# Patient Record
Sex: FEMALE | Race: WHITE
[De-identification: names, ages, dates, MRNs, and addresses within clinical notes are randomized per-mention and may not be internally consistent; named-entity substitution may affect disease eponyms.]

---
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
## 2021-10-11 ENCOUNTER — HOSPITAL ENCOUNTER (EMERGENCY)
Dept: HOSPITAL 52 - LL.ED | Age: 21
Discharge: HOME | End: 2021-10-11
Payer: COMMERCIAL

## 2021-10-11 DIAGNOSIS — N39.0: Primary | ICD-10-CM

## 2021-10-11 DIAGNOSIS — Z88.0: ICD-10-CM

## 2021-10-11 NOTE — EDM.PDOC
ED HPI GENERAL MEDICAL PROBLEM





- General


Chief Complaint: Genitourinary Problem


Stated Complaint: UTI Symptoms


Time Seen by Provider: 10/11/21 20:35


Source of Information: Reports: Patient


History Limitations: Reports: No Limitations





- History of Present Illness


INITIAL COMMENTS - FREE TEXT/NARRATIVE: 





Pt. presents to ER with complaints of dysuria and frequency for the past day or 

so. She denies any nausea or vomiting. No lightheadedness. Denies any fever or 

chills. 


Pt. has a history of increased frequency of UTIs in the past. The last infection

she was started on macrobid and she states that the symptoms did improve after 

finishing the course of medication.


Pt. denies any fever or chills. Denies any abdomen pain.


Onset: Today


Onset Date: 10/11/21


Quality: Reports: Burning (with urination)





- Related Data


                                    Allergies











Allergy/AdvReac Type Severity Reaction Status Date / Time


 


amoxicillin [From Augmentin] Allergy  Cannot Verified 20 19:30





   Remember  


 


clavulanic acid Allergy  Cannot Verified 20 19:30





[From Augmentin]   Remember  











Home Meds: 


                                    Home Meds





FLUoxetine [PROzac] 10 mg PO DAILY 20 [History]


Pnv No.95/Ferrous Fum/Folic AC [Prenatal Vitamin Tablet] 1 tab PO DAILY 20

[History]


clindamycin HCL [Cleocin HCl] 150 mg PO QID #28 capsule 20 [Rx]











Past Medical History





- Past Health History


Medical/Surgical History: Denies Medical/Surgical History


OB/GYN History: Reports: Pregnancy.  Denies: Spontaneous 


Other OB/GYN History: 8 months gestation with EDC of 2020.


Psychiatric History: Reports: Depression


Dermatologic History: Reports: Other (See Below)


Other Dermatologic History: cat bite to left foot, potential cellulitis 20





- Past Imaging History


Past Imaging History: Reports: Ultrasound (OB ultrasounds including in this fa

cility on 2020.)





Social & Family History





- Caffeine Use


Caffeine Use: Reports: None





- Living Situation & Occupation


Living situation: Reports: with Significant Other (And his parents)


Occupation: Employed ( at local [employer])





ED ROS GENERAL





- Review of Systems


Review Of Systems: See Below


Constitutional: Reports: No Symptoms


HEENT: Reports: No Symptoms


Respiratory: Reports: No Symptoms


Cardiovascular: Reports: No Symptoms


Endocrine: Reports: No Symptoms


GI/Abdominal: Reports: No Symptoms


: Reports: Dysuria


Musculoskeletal: Reports: No Symptoms


Skin: Reports: No Symptoms


Neurological: Reports: No Symptoms


Psychiatric: Reports: No Symptoms


Hematologic/Lymphatic: Reports: No Symptoms


Immunologic: Reports: No Symptoms





ED EXAM, GENERAL





- Physical Exam


Exam: See Below


Exam Limited By: No Limitations


General Appearance: Alert, WD/WN, No Apparent Distress


Neurological: Alert, Oriented, CN II-XII Intact, Normal Cognition, Normal Gait, 

Normal Reflexes, No Motor/Sensory Deficits


Psychiatric: Normal Affect, Normal Mood


Skin Exam: Warm, Dry, Intact, Normal Color, No Rash





Course





- Orders/Labs/Meds


Orders: 





                               Active Orders 24 hr











 Category Date Time Status


 


 CULTURE URINE [RM] Stat Lab  10/11/21 20:33 Received











Labs: 





                                Laboratory Tests











  10/11/21 Range/Units





  20:33 


 


Specimen Type  Urincc  


 


Urine Color  Light yellow  


 


Urine Appearance  Cloudy  


 


Urine pH  7.5  (5.0-9.0)  


 


Ur Specific Gravity  1.020  (1.005-1.030)  


 


Urine Protein  Negative  (NEGATIVE)  mg/dL


 


Urine Glucose (UA)  Negative  (NEGATIVE)  mg/dL


 


Urine Ketones  Negative  (NEGATIVE)  mg/dL


 


Urine Occult Blood  Moderate H  (NEGATIVE)  


 


Urine Nitrite  Negative  (NEGATIVE)  


 


Urine Bilirubin  Negative  (NEGATIVE)  


 


Urine Urobilinogen  0.2  (0.2-1.0)  E.U./dL


 


Ur Leukocyte Esterase  Large H  (NEGATIVE)  


 


Urine RBC  10-20 H  /HPF


 


Urine WBC  50-75 H  /HPF


 


Ur Epithelial Cells  Few  /LPF


 


Urine Bacteria  Moderate H  (NONE TO FEW)  /HPF














Departure





- Departure


Time of Disposition: 21:20


Disposition: Home, Self-Care 01


Clinical Impression: 


 UTI, Urinary tract infectious disease








- Discharge Information


Instructions:  Sulfamethoxazole; Trimethoprim, SMX-TMP tablets, Probiotics


Forms:  ED Department Discharge


Additional Instructions: 


Bactrim DS


1 twice daily for 7 days





Tylenol and ibuprofen as needed for discomfort





Recheck in clinic in 7 days to ensure infection is resolved





Return to ER if not gradually improving





- Problem List Review


Problem List Initiated/Reviewed/Updated: Yes





- My Orders


Last 24 Hours: 





My Active Orders





10/11/21 20:33


CULTURE URINE [RM] Stat 














- Assessment/Plan


Last 24 Hours: 





My Active Orders





10/11/21 20:33


CULTURE URINE [RM] Stat 











Plan: 





Bactrim DS


1 twice daily for 7 days





Tylenol and ibuprofen as needed for discomfort





Recheck in clinic in 7 days to ensure infection is resolved





Return to ER if not gradually improving